# Patient Record
Sex: FEMALE | Race: WHITE | NOT HISPANIC OR LATINO | URBAN - METROPOLITAN AREA
[De-identification: names, ages, dates, MRNs, and addresses within clinical notes are randomized per-mention and may not be internally consistent; named-entity substitution may affect disease eponyms.]

---

## 2020-01-01 ENCOUNTER — OUTPATIENT (OUTPATIENT)
Dept: OUTPATIENT SERVICES | Facility: HOSPITAL | Age: 35
LOS: 1 days | End: 2020-01-01
Payer: MEDICAID

## 2020-01-28 DIAGNOSIS — Z71.89 OTHER SPECIFIED COUNSELING: ICD-10-CM

## 2020-05-01 PROCEDURE — G9005: CPT

## 2022-03-10 ENCOUNTER — NON-APPOINTMENT (OUTPATIENT)
Age: 37
End: 2022-03-10

## 2022-03-10 DIAGNOSIS — N91.2 AMENORRHEA, UNSPECIFIED: ICD-10-CM

## 2022-03-10 DIAGNOSIS — Z33.2 ENCOUNTER FOR ELECTIVE TERMINATION OF PREGNANCY: ICD-10-CM

## 2022-03-10 DIAGNOSIS — Z78.9 OTHER SPECIFIED HEALTH STATUS: ICD-10-CM

## 2022-03-10 RX ORDER — DEXTROAMPHETAMINE SACCHARATE, AMPHETAMINE ASPARTATE, DEXTROAMPHETAMINE SULFATE, AND AMPHETAMINE SULFATE 3.75; 3.75; 3.75; 3.75 MG/1; MG/1; MG/1; MG/1
TABLET ORAL
Refills: 0 | Status: ACTIVE | COMMUNITY

## 2022-03-10 RX ORDER — ARIPIPRAZOLE 2 MG/1
TABLET ORAL
Refills: 0 | Status: ACTIVE | COMMUNITY

## 2022-03-10 RX ORDER — CLONAZEPAM 2 MG
TABLET,DISINTEGRATING ORAL
Refills: 0 | Status: ACTIVE | COMMUNITY

## 2022-03-17 ENCOUNTER — APPOINTMENT (OUTPATIENT)
Dept: OBGYN | Facility: CLINIC | Age: 37
End: 2022-03-17
Payer: MEDICAID

## 2022-03-17 VITALS
HEIGHT: 58 IN | HEART RATE: 88 BPM | BODY MASS INDEX: 34.63 KG/M2 | WEIGHT: 165 LBS | SYSTOLIC BLOOD PRESSURE: 131 MMHG | DIASTOLIC BLOOD PRESSURE: 88 MMHG

## 2022-03-17 DIAGNOSIS — O20.0 THREATENED ABORTION: ICD-10-CM

## 2022-03-17 PROBLEM — Z00.00 ENCOUNTER FOR PREVENTIVE HEALTH EXAMINATION: Noted: 2022-03-17

## 2022-03-17 PROCEDURE — 99212 OFFICE O/P EST SF 10 MIN: CPT

## 2022-03-17 PROCEDURE — 81025 URINE PREGNANCY TEST: CPT

## 2022-03-17 PROCEDURE — 36415 COLL VENOUS BLD VENIPUNCTURE: CPT

## 2022-03-17 NOTE — HISTORY OF PRESENT ILLNESS
[FreeTextEntry1] : pt states she started spotting today 3/17/22.\par \par Patient presents for evaluation and states that she had home positive pregnancy test 2 weeks prior\par Patient states she has been spotting over the past 2436 hrs.\par UCG here in the office today is negative\par Results discussed with the patient and her \par Patient will have a beta-hCG and progesterone drawn and will be called with results\par Patient's blood type is a positive\par No physical exam performed today\par \par Patient will return in the future for complete annual with Pap smear after she stops bleeding

## 2022-03-17 NOTE — PLAN
[FreeTextEntry1] : Beta-hCG and progesterone drawn today\par Patient will be called with the results and follow-up

## 2022-03-18 LAB
HCG SERPL-MCNC: <1 MIU/ML
HCG UR QL: NEGATIVE
PROGEST SERPL-MCNC: 0.3 NG/ML

## 2022-06-02 ENCOUNTER — APPOINTMENT (OUTPATIENT)
Dept: OBGYN | Facility: CLINIC | Age: 37
End: 2022-06-02
Payer: MEDICAID

## 2022-06-02 VITALS
HEART RATE: 92 BPM | BODY MASS INDEX: 32.75 KG/M2 | DIASTOLIC BLOOD PRESSURE: 80 MMHG | WEIGHT: 156 LBS | HEIGHT: 58 IN | SYSTOLIC BLOOD PRESSURE: 120 MMHG

## 2022-06-02 DIAGNOSIS — N76.0 ACUTE VAGINITIS: ICD-10-CM

## 2022-06-02 PROCEDURE — 99213 OFFICE O/P EST LOW 20 MIN: CPT

## 2022-06-02 RX ORDER — METRONIDAZOLE 500 MG/1
500 TABLET ORAL TWICE DAILY
Qty: 20 | Refills: 0 | Status: ACTIVE | COMMUNITY
Start: 2022-06-02 | End: 1900-01-01

## 2022-06-02 RX ORDER — CEPHALEXIN 500 MG/1
500 CAPSULE ORAL 4 TIMES DAILY
Qty: 40 | Refills: 0 | Status: ACTIVE | COMMUNITY
Start: 2022-06-02 | End: 1900-01-01

## 2022-06-02 NOTE — PLAN
[FreeTextEntry1] : Patient is a 37-year-old  5 para 2-0-3-2 last menstrual period May 28, 2022\par Patient presents complaining of vaginal pain and pelvic pain after relations approximately 3 weeks ago, and now the pain is persistent and is with every time she is being penetrated\par Patient denies any vaginal discharge, odor, fevers\par Pelvic exam shows normal female external genitalia, vaginal introitus tender at the 11 o'clock position with the point tenderness, minimal erythema no evidence of skin breakdown or erosion.\par Mild cervical motion tenderness, mild uterine tenderness,.\par Vaginal culture performed\par Urine culture sent\par Patient to be treated with Flagyl 500 mg twice a day for 10 days and also Keflex 500 every 6 hours for 10 days\par Follow-up visit in the office in 2 weeks after completion of the above therapy\par Patient that she understands\par Will await cultures of urine sample and vaginal cultures to possibly consider changing antibiotic treatment

## 2022-06-02 NOTE — PHYSICAL EXAM
[Chaperone Present] : A chaperone was present in the examining room during all aspects of the physical examination [Labia Majora] : normal [Labia Minora] : normal [Normal] : normal [Tenderness] : tender [Anteversion] : anteverted [Mass ___ cm] : no uterine mass was palpated [Uterine Adnexae] : normal [FreeTextEntry4] : Asked meTender in the upper introitus vaginal vault at approximately 11:00\par No evidence of lesions,,, minimal erythema [FreeTextEntry5] : Mild cervical motion tenderness

## 2022-06-02 NOTE — HISTORY OF PRESENT ILLNESS
[FreeTextEntry1] : Patient 37-year-old  5 para 2-0-3-2 last menstrual period May 28, 2022\par Patient presents for evaluation complaining of pain in the pelvic vaginal area for the past 3 weeks after relations that occurred 3 weeks ago but this point pain is with every time she is being penetrated\par Patient denies any vaginal discharge, order, fevers
97.8

## 2022-06-03 LAB
APPEARANCE: CLEAR
BILIRUBIN URINE: NEGATIVE
BLOOD URINE: NEGATIVE
COLOR: YELLOW
GLUCOSE QUALITATIVE U: NEGATIVE
KETONES URINE: NEGATIVE
LEUKOCYTE ESTERASE URINE: NEGATIVE
NITRITE URINE: NEGATIVE
PH URINE: 6.5
PROTEIN URINE: NEGATIVE
SPECIFIC GRAVITY URINE: 1.01
UROBILINOGEN URINE: NORMAL

## 2022-06-05 LAB — BACTERIA UR CULT: NORMAL

## 2022-06-07 ENCOUNTER — NON-APPOINTMENT (OUTPATIENT)
Age: 37
End: 2022-06-07

## 2022-06-08 LAB
A VAGINAE DNA VAG QL NAA+PROBE: ABNORMAL
BVAB2 DNA VAG QL NAA+PROBE: ABNORMAL
C KRUSEI DNA VAG QL NAA+PROBE: NEGATIVE
C TRACH RRNA SPEC QL NAA+PROBE: NEGATIVE
MEGA1 DNA VAG QL NAA+PROBE: ABNORMAL
N GONORRHOEA RRNA SPEC QL NAA+PROBE: NEGATIVE
T VAGINALIS RRNA SPEC QL NAA+PROBE: NEGATIVE

## 2022-06-16 ENCOUNTER — APPOINTMENT (OUTPATIENT)
Dept: OBGYN | Facility: CLINIC | Age: 37
End: 2022-06-16
Payer: MEDICAID

## 2022-06-16 VITALS
HEIGHT: 58 IN | SYSTOLIC BLOOD PRESSURE: 118 MMHG | BODY MASS INDEX: 32.75 KG/M2 | HEART RATE: 94 BPM | WEIGHT: 156 LBS | DIASTOLIC BLOOD PRESSURE: 78 MMHG

## 2022-06-16 PROCEDURE — 99213 OFFICE O/P EST LOW 20 MIN: CPT

## 2022-06-16 RX ORDER — FLUCONAZOLE 150 MG/1
150 TABLET ORAL
Qty: 1 | Refills: 2 | Status: ACTIVE | COMMUNITY
Start: 2022-06-16 | End: 1900-01-01

## 2022-06-16 NOTE — PHYSICAL EXAM
[Chaperone Present] : A chaperone was present in the examining room during all aspects of the physical examination [Labia Majora] : normal [Labia Minora] : normal [Normal] : normal [FreeTextEntry2] : Left labia minor wirh exquisite point tenderness

## 2022-06-16 NOTE — PLAN
[FreeTextEntry1] : Patient is a 37-year-old  5 para 2-0-3-2 last menstrual period May 28, 2022\par Patient presents for follow-up\par Patient has been treated for vaginitis with antibiotics but has persistent point tenderness on the left labia minora\par Minimal white discharge,,, vaginal culture done,,, consistent with vaginal yeast,,, patient will be treated with Diflucan\par Point tenderness of the labia minora on the left side consistent with area of slight disruption of the tissue that has healed.\par Possible neuroma from the healing process,,, may explain the point tenderness\par Patient will be treated with Diflucan as stated for the yeast infection,,, of course pending culture results treatment may be altered\par 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

## 2022-06-16 NOTE — HISTORY OF PRESENT ILLNESS
[FreeTextEntry1] : Patient is a 37-year-old  5 para 2-0-3-2 last menstrual period May 28, 2022\par Patient presents for a follow-up after being treated for vaginitis but has persistent pain in the introitus of the vagina

## 2022-06-20 LAB
A VAGINAE DNA VAG QL NAA+PROBE: NORMAL
BVAB2 DNA VAG QL NAA+PROBE: NORMAL
C KRUSEI DNA VAG QL NAA+PROBE: NEGATIVE
C KRUSEI DNA VAG QL NAA+PROBE: POSITIVE
C TRACH RRNA SPEC QL NAA+PROBE: NEGATIVE
MEGA1 DNA VAG QL NAA+PROBE: NORMAL
N GONORRHOEA RRNA SPEC QL NAA+PROBE: NEGATIVE
T VAGINALIS RRNA SPEC QL NAA+PROBE: NEGATIVE

## 2022-07-19 ENCOUNTER — RESULT CHARGE (OUTPATIENT)
Age: 37
End: 2022-07-19

## 2022-07-19 ENCOUNTER — APPOINTMENT (OUTPATIENT)
Age: 37
End: 2022-07-19

## 2022-07-19 VITALS
WEIGHT: 153 LBS | BODY MASS INDEX: 32.12 KG/M2 | HEIGHT: 58 IN | SYSTOLIC BLOOD PRESSURE: 131 MMHG | DIASTOLIC BLOOD PRESSURE: 85 MMHG

## 2022-07-19 DIAGNOSIS — R10.2 PELVIC AND PERINEAL PAIN: ICD-10-CM

## 2022-07-19 DIAGNOSIS — N94.10 UNSPECIFIED DYSPAREUNIA: ICD-10-CM

## 2022-07-19 DIAGNOSIS — Z78.9 OTHER SPECIFIED HEALTH STATUS: ICD-10-CM

## 2022-07-19 DIAGNOSIS — N94.819 VULVODYNIA, UNSPECIFIED: ICD-10-CM

## 2022-07-19 LAB
BILIRUB UR QL STRIP: NORMAL
CLARITY UR: CLEAR
COLLECTION METHOD: NORMAL
GLUCOSE UR-MCNC: NORMAL
HCG UR QL: 0.2 EU/DL
HGB UR QL STRIP.AUTO: NORMAL
KETONES UR-MCNC: NORMAL
LEUKOCYTE ESTERASE UR QL STRIP: NORMAL
NITRITE UR QL STRIP: NORMAL
PH UR STRIP: 6
PROT UR STRIP-MCNC: NORMAL
SP GR UR STRIP: 1.02

## 2022-07-19 PROCEDURE — 99214 OFFICE O/P EST MOD 30 MIN: CPT

## 2022-07-19 PROCEDURE — 51798 US URINE CAPACITY MEASURE: CPT

## 2022-07-19 PROCEDURE — 81003 URINALYSIS AUTO W/O SCOPE: CPT | Mod: QW

## 2022-07-19 PROCEDURE — 99204 OFFICE O/P NEW MOD 45 MIN: CPT

## 2022-07-19 RX ORDER — OXCARBAZEPINE 150 MG/1
150 TABLET, FILM COATED ORAL
Qty: 60 | Refills: 0 | Status: ACTIVE | COMMUNITY
Start: 2021-10-15

## 2022-07-19 RX ORDER — ARIPIPRAZOLE 10 MG/1
10 TABLET ORAL
Qty: 30 | Refills: 0 | Status: ACTIVE | COMMUNITY
Start: 2021-09-10

## 2022-07-19 RX ORDER — DEXTROAMPHETAMINE SACCHARATE, AMPHETAMINE ASPARTATE, DEXTROAMPHETAMINE SULFATE AND AMPHETAMINE SULFATE 5; 5; 5; 5 MG/1; MG/1; MG/1; MG/1
20 TABLET ORAL
Qty: 90 | Refills: 0 | Status: ACTIVE | COMMUNITY
Start: 2022-07-07

## 2022-07-19 RX ORDER — CLONAZEPAM 1 MG/1
1 TABLET ORAL
Qty: 60 | Refills: 0 | Status: ACTIVE | COMMUNITY
Start: 2022-04-15

## 2022-07-19 NOTE — DISCUSSION/SUMMARY
[FreeTextEntry1] : \gloria Britton presents with difficulty with intercourse. 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 I recommend she f/u with Yadira MALONE, can see me sooner if no improvement in symptoms. All questions were answered

## 2022-07-19 NOTE — HISTORY OF PRESENT ILLNESS
[FreeTextEntry1] : \par Reba presents for a tear in the vagina, she reports that there is a defect in the vagina, she reports that has been for 2 months, she reports that she saw a plastic surgeon, she denies urinary complaints, no intermittent stream , no UTIs, no bulge symptoms, not breast feeding, continues to get menses, does not use contraception, she reports that she does not have vaginal bleeding, she reports that she had negative testing, she reports that she cannot have intercourse bc it is painful, she reports that she feels like a knife is inserted inside of her, she is not in pain any other time of day, not in pain with urination, does not use tamps, all of a sudden she thought she was sore from sex but cannot note\par \par positive candida 7/16

## 2022-07-19 NOTE — OB HISTORY
[ ___] : [unfilled]  section delivery(s) [Regular Cycle Intervals] : periods have been regular [Sexually Active] : sexually active

## 2022-07-19 NOTE — PHYSICAL EXAM
[Chaperone Present] : A chaperone was present in the examining room during all aspects of the physical examination [No Acute Distress] : in no acute distress [Well developed] : well developed [Well Nourished] : ~L well nourished [Oriented x3] : oriented to person, place, and time [Normal Memory] : ~T memory was ~L unimpaired [Normal Mood/Affect] : mood and affect are normal [No Edema] : ~T edema was not present [None] : no CVA tenderness [Warm and Dry] : was warm and dry to touch [Normal Gait] : gait was normal [Labia Majora] : were normal [Labia Minora] : were normal [Normal Appearance] : general appearance was normal [No Bleeding] : there was no active vaginal bleeding [2] : 2 [Normal] : no abnormalities [Cough] : no cough [Tenderness] : ~T no ~M abdominal tenderness observed [Distended] : not distended [Inguinal LAD] : no adenopathy was noted in the inguinal lymph nodes [FreeTextEntry4] : no levator spasm, qtip tenderness 3 to 9 oclock, small hymenal tear at 3 oclock  [de-identified] : no POP [de-identified] : unable to do speculum due to pain

## 2022-07-26 ENCOUNTER — NON-APPOINTMENT (OUTPATIENT)
Age: 37
End: 2022-07-26

## 2022-07-26 ENCOUNTER — APPOINTMENT (OUTPATIENT)
Dept: UROGYNECOLOGY | Facility: CLINIC | Age: 37
End: 2022-07-26

## 2022-08-12 ENCOUNTER — EMERGENCY (EMERGENCY)
Facility: HOSPITAL | Age: 37
LOS: 0 days | Discharge: HOME | End: 2022-08-12
Attending: EMERGENCY MEDICINE
Payer: MEDICAID

## 2022-08-12 VITALS
DIASTOLIC BLOOD PRESSURE: 85 MMHG | TEMPERATURE: 100 F | OXYGEN SATURATION: 95 % | HEART RATE: 115 BPM | RESPIRATION RATE: 18 BRPM | HEIGHT: 59 IN | SYSTOLIC BLOOD PRESSURE: 150 MMHG | WEIGHT: 115.08 LBS

## 2022-08-12 DIAGNOSIS — F41.9 ANXIETY DISORDER, UNSPECIFIED: ICD-10-CM

## 2022-08-12 DIAGNOSIS — Z88.2 ALLERGY STATUS TO SULFONAMIDES: ICD-10-CM

## 2022-08-12 DIAGNOSIS — F20.9 SCHIZOPHRENIA, UNSPECIFIED: ICD-10-CM

## 2022-08-12 PROCEDURE — 99284 EMERGENCY DEPT VISIT MOD MDM: CPT

## 2022-08-12 PROCEDURE — 99283 EMERGENCY DEPT VISIT LOW MDM: CPT

## 2022-08-12 NOTE — ED PROVIDER NOTE - PATIENT PORTAL LINK FT
You can access the FollowMyHealth Patient Portal offered by [facility] by registering at the following website: [email or web address]. By joining [employer]’s FollowMyHealth portal, you will also be able to view your health information using other applications (apps) compatible with our system.

## 2022-08-12 NOTE — ED PROVIDER NOTE - NSFOLLOWUPINSTRUCTIONS_ED_ALL_ED_FT
Depression    WHAT YOU NEED TO KNOW:    Depression is a medical condition that causes feelings of sadness or hopelessness that do not go away. Depression may cause you to lose interest in things you used to enjoy. These feelings may interfere with your daily life.    DISCHARGE INSTRUCTIONS:    Call your local emergency number (911 in the ) if:     You think about harming yourself or someone else.      You have done something on purpose to hurt yourself.    Call your therapist or doctor if:     Your symptoms do not improve.      You cannot make it to your next appointment.       You have new symptoms.      You have questions or concerns about your condition or care.    The following resources are available at any time to help you, if needed:     National Suicide Prevention Lifeline: 1-396.108.7050 (8-417-974-TALK)      Suicide Hotline: 1-683.568.6566 (1-220-LOWEZKB)      For a list of international numbers: https://[employer].org/find-help/international-resources/    Medicines:     Antidepressants may be given to improve or balance your mood. You may need to take this medicine for several weeks before you begin to feel better.      Take your medicine as directed. Contact your healthcare provider if you think your medicine is not helping or if you have side effects. Tell him of her if you are allergic to any medicine. Keep a list of the medicines, vitamins, and herbs you take. Include the amounts, and when and why you take them. Bring the list or the pill bottles to follow-up visits. Carry your medicine list with you in case of an emergency.    Therapy is often used together with medicine to relieve depression. Therapy is a way for you to talk about your feelings and anything that may be causing depression. Therapy can be done alone or in a group. It may also be done with family members or a significant other.    Self-care:     Get regular physical activity. Try to be active for 30 minutes, 3 to 5 days a week. Physical activity can help relieve depression. Work with your healthcare provider to develop a plan that you enjoy. It may help to ask someone to be active with you.      Create a regular sleep schedule. A routine can help you relax before bed. Listen to music, read, or do yoga. Try to go to bed and wake up at the same time every day. Sleep is important for emotional health.      Eat a variety of healthy foods. Healthy foods include fruits, vegetables, whole-grain breads, low-fat dairy products, lean meats, fish, and cooked beans. A healthy meal plan is low in fat, salt, and added sugar.      Do not drink alcohol or use drugs. Alcohol and drugs can make depression worse. Talk to your therapist or doctor if you need help quitting.    Follow up with your healthcare provider as directed: Your healthcare provider will monitor your progress at follow-up visits. He or she will also monitor your medicine if you take antidepressants. Your healthcare provider will ask if the medicine is helping. Tell him or her about any side effects or problems you may have with your medicine. The type or amount of medicine may need to be changed. Write down your questions so you remember to ask them during your visits.    Anxiety    WHAT YOU NEED TO KNOW:    Anxiety is a condition that causes you to feel extremely worried or nervous. The feelings are so strong that they can cause problems with your daily activities or sleep. Anxiety may be triggered by something you fear, or it may happen without a cause. Family or work stress, smoking, caffeine, and alcohol can increase your risk for anxiety. Certain medicines or health conditions can also increase your risk. Anxiety can become a long-term condition if it is not managed or treated.     DISCHARGE INSTRUCTIONS:    Call 911 if:     You have chest pain, tightness, or heaviness that may spread to your shoulders, arms, jaw, neck, or back.      You feel like hurting yourself or someone else.     Contact your healthcare provider if:     Your symptoms get worse or do not get better with treatment.      Your anxiety keeps you from doing your regular daily activities.      You have new symptoms since your last visit.      You have questions or concerns about your condition or care.    Medicines:     Medicines may be given to help you feel more calm and relaxed, and decrease your symptoms.      Take your medicine as directed. Contact your healthcare provider if you think your medicine is not helping or if you have side effects. Tell him of her if you are allergic to any medicine. Keep a list of the medicines, vitamins, and herbs you take. Include the amounts, and when and why you take them. Bring the list or the pill bottles to follow-up visits. Carry your medicine list with you in case of an emergency.    Follow up with your healthcare provider within 2 weeks or as directed: Write down your questions so you remember to ask them during your visits.    Manage anxiety:     Talk to someone about your anxiety. Your healthcare provider may suggest counseling. Cognitive behavioral therapy can help you understand and change how you react to events that trigger your symptoms. You might feel more comfortable talking with a friend or family member about your anxiety. Choose someone you know will be supportive and encouraging.      Find ways to relax. Activities such as exercise, meditation, or listening to music can help you relax. Spend time with friends, or do things you enjoy.      Practice deep breathing. Deep breathing can help you relax when you feel anxious. Focus on taking slow, deep breaths several times a day, or during an anxiety attack. Breathe in through your nose and out through your mouth.       Create a regular sleep routine. Regular sleep can help you feel calmer during the day. Go to sleep and wake up at the same times every day. Do not watch television or use the computer right before bed. Your room should be comfortable, dark, and quiet.       Eat a variety of healthy foods. Healthy foods include fruits, vegetables, low-fat dairy products, lean meats, fish, whole-grain breads, and cooked beans. Healthy foods can help you feel less anxious and have more energy.      Exercise regularly. Exercise can increase your energy level. Exercise may also lift your mood and help you sleep better. Your healthcare provider can help you create an exercise plan.      Do not smoke. Nicotine and other chemicals in cigarettes and cigars can increase anxiety. Ask your healthcare provider for information if you currently smoke and need help to quit. E-cigarettes or smokeless tobacco still contain nicotine. Talk to your healthcare provider before you use these products.       Do not have caffeine. Caffeine can make your symptoms worse. Do not have foods or drinks that are meant to increase your energy level.      Limit or do not drink alcohol. Ask your healthcare provider if alcohol is safe for you. You may not be able to drink alcohol if you take certain anxiety or depression medicines. Limit alcohol to 1 drink per day if you are a woman. Limit alcohol to 2 drinks per day if you are a man. A drink of alcohol is 12 ounces of beer, 5 ounces of wine, or 1½ ounces of liquor.       Do not use drugs. Drugs can make your anxiety worse. It can also make anxiety hard to manage. Talk to your healthcare provider if you use drugs and want help to quit.    Follow-up with your psychiatrist in 1 to 3 days.

## 2022-08-12 NOTE — ED PROVIDER NOTE - PSYCHIATRIC, MLM
Alert and oriented to person, place, time/situation. Pt is anxious. no apparent risk to self or others.

## 2022-08-12 NOTE — ED PROVIDER NOTE - OBJECTIVE STATEMENT
36 y/o with a history of schizophrenia comes in after going to the [facility] asking for help. Pt did not want to disclose why she was here, stating "you don't need to know the whole story". Pt later states that she lives alone and feels like there are people in the house. Pt may be having hallucinations. Pt states she takes Adderall, but did not state if she took any medication for her schizophrenia. Pt refuses to answer any other questions. Refused to answer if she has SI, HI.

## 2022-08-12 NOTE — ED ADULT TRIAGE NOTE - CHIEF COMPLAINT QUOTE
patient walked into police precinct asking for help. patient report she has not been taking medications for bipolar and schizophrenia. denies SI/HI. patient close to nurses station for enhanced supervision.

## 2022-08-12 NOTE — ED PROVIDER NOTE - PROGRESS NOTE DETAILS
Patient denying any SI/HI or safety concerns and does not want to be evaluated medically or by psychiatry at this time.  Patient is alert and oriented and clinically sober.  Patient will be discharged with strict return precautions.    Jason Crawford, DO

## 2023-08-17 ENCOUNTER — EMERGENCY (EMERGENCY)
Facility: HOSPITAL | Age: 38
LOS: 1 days | Discharge: HOME | End: 2023-08-17
Attending: STUDENT IN AN ORGANIZED HEALTH CARE EDUCATION/TRAINING PROGRAM
Payer: MEDICAID

## 2023-08-17 VITALS
SYSTOLIC BLOOD PRESSURE: 104 MMHG | TEMPERATURE: 98 F | HEART RATE: 67 BPM | OXYGEN SATURATION: 100 % | RESPIRATION RATE: 18 BRPM | DIASTOLIC BLOOD PRESSURE: 58 MMHG

## 2023-08-17 VITALS
DIASTOLIC BLOOD PRESSURE: 115 MMHG | RESPIRATION RATE: 20 BRPM | HEART RATE: 93 BPM | WEIGHT: 160.06 LBS | SYSTOLIC BLOOD PRESSURE: 149 MMHG | OXYGEN SATURATION: 100 % | HEIGHT: 63 IN | TEMPERATURE: 98 F

## 2023-08-17 DIAGNOSIS — F43.20 ADJUSTMENT DISORDER, UNSPECIFIED: ICD-10-CM

## 2023-08-17 PROBLEM — Z86.59 PERSONAL HISTORY OF OTHER MENTAL AND BEHAVIORAL DISORDERS: Chronic | Status: ACTIVE | Noted: 2022-08-12

## 2023-08-17 LAB
AMPHET UR-MCNC: NEGATIVE — SIGNIFICANT CHANGE UP
ANION GAP SERPL CALC-SCNC: 11 MMOL/L — SIGNIFICANT CHANGE UP (ref 5–17)
APAP SERPL-MCNC: <3 UG/ML — LOW (ref 10–26)
APPEARANCE UR: CLEAR — SIGNIFICANT CHANGE UP
BACTERIA # UR AUTO: ABNORMAL
BARBITURATES UR SCN-MCNC: NEGATIVE — SIGNIFICANT CHANGE UP
BASOPHILS # BLD AUTO: 0.06 K/UL — SIGNIFICANT CHANGE UP (ref 0–0.2)
BASOPHILS NFR BLD AUTO: 0.5 % — SIGNIFICANT CHANGE UP (ref 0–2)
BENZODIAZ UR-MCNC: NEGATIVE — SIGNIFICANT CHANGE UP
BILIRUB UR-MCNC: NEGATIVE — SIGNIFICANT CHANGE UP
BUN SERPL-MCNC: 18.6 MG/DL — SIGNIFICANT CHANGE UP (ref 8–20)
CALCIUM SERPL-MCNC: 9.2 MG/DL — SIGNIFICANT CHANGE UP (ref 8.4–10.5)
CHLORIDE SERPL-SCNC: 105 MMOL/L — SIGNIFICANT CHANGE UP (ref 96–108)
CO2 SERPL-SCNC: 26 MMOL/L — SIGNIFICANT CHANGE UP (ref 22–29)
COCAINE METAB.OTHER UR-MCNC: NEGATIVE — SIGNIFICANT CHANGE UP
COLOR SPEC: YELLOW — SIGNIFICANT CHANGE UP
CREAT SERPL-MCNC: 0.7 MG/DL — SIGNIFICANT CHANGE UP (ref 0.5–1.3)
DIFF PNL FLD: ABNORMAL
EGFR: 113 ML/MIN/1.73M2 — SIGNIFICANT CHANGE UP
EOSINOPHIL # BLD AUTO: 0.03 K/UL — SIGNIFICANT CHANGE UP (ref 0–0.5)
EOSINOPHIL NFR BLD AUTO: 0.3 % — SIGNIFICANT CHANGE UP (ref 0–6)
EPI CELLS # UR: SIGNIFICANT CHANGE UP
ETHANOL SERPL-MCNC: <10 MG/DL — SIGNIFICANT CHANGE UP (ref 0–9)
GLUCOSE SERPL-MCNC: 102 MG/DL — HIGH (ref 70–99)
GLUCOSE UR QL: NEGATIVE MG/DL — SIGNIFICANT CHANGE UP
HCT VFR BLD CALC: 38.4 % — SIGNIFICANT CHANGE UP (ref 34.5–45)
HGB BLD-MCNC: 12.8 G/DL — SIGNIFICANT CHANGE UP (ref 11.5–15.5)
IMM GRANULOCYTES NFR BLD AUTO: 0.4 % — SIGNIFICANT CHANGE UP (ref 0–0.9)
KETONES UR-MCNC: NEGATIVE — SIGNIFICANT CHANGE UP
LEUKOCYTE ESTERASE UR-ACNC: ABNORMAL
LYMPHOCYTES # BLD AUTO: 1.23 K/UL — SIGNIFICANT CHANGE UP (ref 1–3.3)
LYMPHOCYTES # BLD AUTO: 10.8 % — LOW (ref 13–44)
MCHC RBC-ENTMCNC: 30.6 PG — SIGNIFICANT CHANGE UP (ref 27–34)
MCHC RBC-ENTMCNC: 33.3 GM/DL — SIGNIFICANT CHANGE UP (ref 32–36)
MCV RBC AUTO: 91.9 FL — SIGNIFICANT CHANGE UP (ref 80–100)
METHADONE UR-MCNC: NEGATIVE — SIGNIFICANT CHANGE UP
MONOCYTES # BLD AUTO: 0.51 K/UL — SIGNIFICANT CHANGE UP (ref 0–0.9)
MONOCYTES NFR BLD AUTO: 4.5 % — SIGNIFICANT CHANGE UP (ref 2–14)
NEUTROPHILS # BLD AUTO: 9.55 K/UL — HIGH (ref 1.8–7.4)
NEUTROPHILS NFR BLD AUTO: 83.5 % — HIGH (ref 43–77)
NITRITE UR-MCNC: NEGATIVE — SIGNIFICANT CHANGE UP
OPIATES UR-MCNC: NEGATIVE — SIGNIFICANT CHANGE UP
PCP SPEC-MCNC: SIGNIFICANT CHANGE UP
PCP UR-MCNC: NEGATIVE — SIGNIFICANT CHANGE UP
PH UR: 6 — SIGNIFICANT CHANGE UP (ref 5–8)
PLATELET # BLD AUTO: 285 K/UL — SIGNIFICANT CHANGE UP (ref 150–400)
POTASSIUM SERPL-MCNC: 3.9 MMOL/L — SIGNIFICANT CHANGE UP (ref 3.5–5.3)
POTASSIUM SERPL-SCNC: 3.9 MMOL/L — SIGNIFICANT CHANGE UP (ref 3.5–5.3)
PROT UR-MCNC: 15
RBC # BLD: 4.18 M/UL — SIGNIFICANT CHANGE UP (ref 3.8–5.2)
RBC # FLD: 13.2 % — SIGNIFICANT CHANGE UP (ref 10.3–14.5)
RBC CASTS # UR COMP ASSIST: SIGNIFICANT CHANGE UP /HPF (ref 0–4)
SALICYLATES SERPL-MCNC: <0.6 MG/DL — LOW (ref 10–20)
SODIUM SERPL-SCNC: 142 MMOL/L — SIGNIFICANT CHANGE UP (ref 135–145)
SP GR SPEC: 1.01 — SIGNIFICANT CHANGE UP (ref 1.01–1.02)
THC UR QL: NEGATIVE — SIGNIFICANT CHANGE UP
UROBILINOGEN FLD QL: NEGATIVE MG/DL — SIGNIFICANT CHANGE UP
WBC # BLD: 11.43 K/UL — HIGH (ref 3.8–10.5)
WBC # FLD AUTO: 11.43 K/UL — HIGH (ref 3.8–10.5)
WBC UR QL: SIGNIFICANT CHANGE UP /HPF (ref 0–5)

## 2023-08-17 PROCEDURE — 84436 ASSAY OF TOTAL THYROXINE: CPT

## 2023-08-17 PROCEDURE — 90792 PSYCH DIAG EVAL W/MED SRVCS: CPT

## 2023-08-17 PROCEDURE — 85025 COMPLETE CBC W/AUTO DIFF WBC: CPT

## 2023-08-17 PROCEDURE — 84480 ASSAY TRIIODOTHYRONINE (T3): CPT

## 2023-08-17 PROCEDURE — 87086 URINE CULTURE/COLONY COUNT: CPT

## 2023-08-17 PROCEDURE — 84484 ASSAY OF TROPONIN QUANT: CPT

## 2023-08-17 PROCEDURE — 99285 EMERGENCY DEPT VISIT HI MDM: CPT | Mod: 25

## 2023-08-17 PROCEDURE — 80048 BASIC METABOLIC PNL TOTAL CA: CPT

## 2023-08-17 PROCEDURE — 93005 ELECTROCARDIOGRAM TRACING: CPT

## 2023-08-17 PROCEDURE — 36415 COLL VENOUS BLD VENIPUNCTURE: CPT

## 2023-08-17 PROCEDURE — 93010 ELECTROCARDIOGRAM REPORT: CPT

## 2023-08-17 PROCEDURE — 81001 URINALYSIS AUTO W/SCOPE: CPT

## 2023-08-17 PROCEDURE — 80307 DRUG TEST PRSMV CHEM ANLYZR: CPT

## 2023-08-17 PROCEDURE — 99285 EMERGENCY DEPT VISIT HI MDM: CPT

## 2023-08-17 PROCEDURE — 84443 ASSAY THYROID STIM HORMONE: CPT

## 2023-08-17 NOTE — ED ADULT TRIAGE NOTE - CHIEF COMPLAINT QUOTE
patient in [employer] custody at court developed "light chest tightness" which has since subsided. patient states she is hearing voices that are telling her to harm herself. patient changed into yellow gown, belongings out of reach. patient in [employer] custody at court developed "light chest tightness" which has since subsided. patient states she is hearing voices that are telling her to harm herself. patient changed into yellow gown, belongings out of reach. states she off her meds

## 2023-08-17 NOTE — ED BEHAVIORAL HEALTH ASSESSMENT NOTE - DETAILS
unavailable; discussed with officers at bedside as per hpi Advised patient to go to nearest ER or call 911, for any of the followin) agitation aggression or anxiety, 2) suicidal or homicidal thoughts 3) worsening of symptoms or 4) side effects of medications

## 2023-08-17 NOTE — ED BEHAVIORAL HEALTH ASSESSMENT NOTE - NSSUICRSKFACTOR_PSY_ALL_CORE
Please go directly to the [facility] in [place] for further evaluation. Current and Past Psychiatric Diagnoses/Activating Events/Stressors

## 2023-08-17 NOTE — ED BEHAVIORAL HEALTH ASSESSMENT NOTE - RISK ASSESSMENT
Unable to fully determine level for risk of violence or suicide due to limited patient history/interview.  However, risk will be mitigated by police returning her to a holding facility and being placed on constant observation.

## 2023-08-17 NOTE — ED BEHAVIORAL HEALTH NOTE - BEHAVIORAL HEALTH NOTE
Brief Psychiatry Note    Attempted to interview earlier this afternoon.  Unable to interview due to patient condition/factors, declines interview and prefers to sleep.  Irritable. Does not appear to be distressed dysphoric or overtly manic or psychotic.  Not internally preoccupied. Not engaging in self dialogue.  Appears comfortable and calm otherwise.  Will re attempt later.

## 2023-08-17 NOTE — ED BEHAVIORAL HEALTH ASSESSMENT NOTE - DESCRIPTION
unknown Vital Signs Last 24 Hrs  T(C): 36.9 (17 Aug 2023 15:14), Max: 36.9 (17 Aug 2023 15:14)  T(F): 98.4 (17 Aug 2023 15:14), Max: 98.4 (17 Aug 2023 15:14)  HR: 67 (17 Aug 2023 15:14) (67 - 93)  BP: 104/58 (17 Aug 2023 15:14) (104/58 - 149/115)  BP(mean): --  RR: 18 (17 Aug 2023 15:14) (18 - 20)  SpO2: 100% (17 Aug 2023 15:14) (100% - 100%)    Parameters below as of 17 Aug 2023 10:56  Patient On (Oxygen Delivery Method): room air

## 2023-08-17 NOTE — ED ADULT NURSE NOTE - CAS DISCH TRANSFER METHOD
Alert and oriented to person, place, time/situation. normal mood and affect. no apparent risk to self or others. PD/Other

## 2023-08-17 NOTE — ED PROVIDER NOTE - NSFOLLOWUPCLINICS_GEN_ALL_ED_FT
[id number] [place] [address]  Phone: [phone]  Fax:   Follow Up Time: 1-3 Days

## 2023-08-17 NOTE — ED BEHAVIORAL HEALTH ASSESSMENT NOTE - HPI (INCLUDE ILLNESS QUALITY, SEVERITY, DURATION, TIMING, CONTEXT, MODIFYING FACTORS, ASSOCIATED SIGNS AND SYMPTOMS)
Patient is a 38 year old woman with unclear psychiatric history though possibly schizophrenia per chart though limited information who was brought in by police from court due to somatic concerns of chest tightness.    Per chart review, "MARY BRAY is a 37yo Female with PMH H/O schizophrenia who presents c/o chest tightness. Pt states she currently has tightness in her chest. She is unable to say when it started, instead stating "my brain is confused". She is unable to answer questions regarding symptoms associated w/ her chest pain such as sob but instead states "my brain is confused". PT admits to having audible command hallucinations w/ voices telling her to hurt herself. She is not able to elaborate on the voices. PT unable to provide further history, pmh, medications, simply states "my brain is confused". Pt in police custody, unwilling to state why."    Police officers at bedside [address] and [address] report that they are unaware of the events leading up to presentation as they were sent to relieve previous officers, unaware of her charges. Do however also state that she was in court to be arraigned. Add that should she be released she will return to a holding facility where she will be on constant observation for safety.    Attempted to interview patient multiple times throughout afternoon. On each evaluation did not appear distressed dysphoric or overtly manic or psychotic. Did however repeatedly state for writer to leave her alone and allow her to rest. On later interview stated that "I'm confused", following up with that she's going to rest further. Denies any acute psychiatric issues concerns or complaints, denies any si/hi/avh. No recent alcohol tobacco or drug use. Unwilling to answer any other questions from this writer. Has been behaviorally under control while in ED. Discussed again with same police officers who add that she was speaking with them in coherent non bizarre fashion moments ago.

## 2023-08-17 NOTE — ED ADULT NURSE NOTE - OBJECTIVE STATEMENT
pt in police custody and was at court and started to c/o chest pain and PD had to bring pt to hospital, pt awake, alert and following commands, pt not answering any of RNs questions, resp even and unlabored no distress noted, pt in yellow gown with no belongings and 1:1 at bedside

## 2023-08-17 NOTE — ED PROVIDER NOTE - PATIENT PORTAL LINK FT
You can access the FollowMyHealth Patient Portal offered by [facility] by registering at the following website: [email or web address]. By joining [email or web address]’s FollowMyHealth portal, you will also be able to view your health information using other applications (apps) compatible with our system.

## 2023-08-17 NOTE — ED BEHAVIORAL HEALTH ASSESSMENT NOTE - OTHER
unknown police officers court also discussed with officers did not assess, handcuffed to bed railing

## 2023-08-17 NOTE — ED PROVIDER NOTE - PHYSICAL EXAMINATION
Gen: Well appearing in NAD  Head: NC/AT  Neck: trachea midline  Resp:  No distress  Abd: soft, nd, nt  Ext: no deformities  Neuro:  A&Ox3. Moving all four extremities spontaneously, walking w/ coordinated gait.   Skin:  Warm and dry as visualized  Psych:  Normal affect and mood

## 2023-08-17 NOTE — ED ADULT NURSE NOTE - CHIEF COMPLAINT QUOTE
patient in [employer] custody at court developed "light chest tightness" which has since subsided. patient states she is hearing voices that are telling her to harm herself. patient changed into yellow gown, belongings out of reach. states she off her meds

## 2023-08-17 NOTE — ED PROVIDER NOTE - ATTENDING CONTRIBUTION TO CARE
RUY Staton: 38F stating her brain isn't working repeatedly, here with police officers, was being taken in when symptoms began. Will check psychiatric clearance order set, d/w  to see if this is c/w psychosis though patient at this time per my exam does not appear to be responding to internal stimuli, does not appear internally preoccupied, there is no pressured speech, she appears calm and continues to cover herself with a bed sheet; does not appear like she is manic or psychotic during my examination.

## 2023-08-17 NOTE — ED BEHAVIORAL HEALTH ASSESSMENT NOTE - SUMMARY
Patient is a 38 year old woman with unclear psychiatric history though possibly schizophrenia per chart though limited information who was brought in by police from court due to somatic concerns of chest tightness.  Unclear psychiatric history, however possibly schizophrenia per chart review. Brought in under police custody, awaiting arraignment. While in the ED, behaviorally under control though uncooperative. On multiple encounters does not appear distressed dysphoric or overtly manic or psychotic. With this writer, denies any acute psychiatric issues concerns or complaints. No si/hi/avh. Limited history/interview due to patient condition/factors, uncooperative. Simply states that she prefers to be left alone to sleep and once with this writer feeling confused though unwilling to elaborate.  Discussed again with same police officers who add that she was speaking with them in coherent non bizarre fashion moments ago before this writer. Suggestive that her presentation is primarily volitional and not psychiatric in nature.

## 2023-08-17 NOTE — ED PROVIDER NOTE - CLINICAL SUMMARY MEDICAL DECISION MAKING FREE TEXT BOX
ASSESSMENT:   MARY BRAY is a 37yo F who presented with chest tightness x 1 day. Unwilling or unable to provide further HPI, PMH. Endorsing command hallucinations. In police custody. Vitals stable. Physical exam non contributory.     PLAN: Medical clearance, psych evaluation. Eval for ACS.

## 2023-08-17 NOTE — ED PROVIDER NOTE - NS ED ROS FT
Review of Systems  SKIN: not answering ROS questions  RESPIRATORY: not answering ROS questions  CARDIAC: +CHEST TIGHTNESS  GI: not answering ROS questions  MUSCULOSKELETAL:  not answering ROS questions  NEURO: not answering ROS questions

## 2023-08-17 NOTE — ED PROVIDER NOTE - NSFOLLOWUPINSTRUCTIONS_ED_ALL_ED_FT
Follow up with the resources provided to you by our behavioral health specialists.    Follow up with your primary doctor as needed for repeat evaluation.    Return here or go to the nearest emergency department for any new symptoms of acute concern such as severe pain, intractable nausea/vomiting, or other new worries.

## 2023-08-17 NOTE — ED PROVIDER NOTE - OBJECTIVE STATEMENT
MARY BRAY is a 39yo Female with PMH H/O schizophrenia who presents c/o chest tightness. Pt states she currently has tightness in her chest. She is unable to say when it started, instead stating "my brain is confused". She is unable to answer questions regarding symptoms associated w/ her chest pain such as sob but instead states "my brain is confused". PT admits to having audible command hallucinations w/ voices telling her to hurt herself. She is not able to elaborate on the voices. PT unable to provide further history, pmh, medications, simply states "my brain is confused". Pt in police custody, unwilling to state why.

## 2023-08-18 LAB
CULTURE RESULTS: SIGNIFICANT CHANGE UP
SPECIMEN SOURCE: SIGNIFICANT CHANGE UP

## 2023-12-19 ENCOUNTER — OUTPATIENT (OUTPATIENT)
Dept: OUTPATIENT SERVICES | Facility: HOSPITAL | Age: 38
LOS: 1 days | End: 2023-12-19
Payer: COMMERCIAL

## 2023-12-19 DIAGNOSIS — R01.1 CARDIAC MURMUR, UNSPECIFIED: ICD-10-CM

## 2023-12-19 PROCEDURE — 93306 TTE W/DOPPLER COMPLETE: CPT | Mod: 26

## 2023-12-19 PROCEDURE — 93306 TTE W/DOPPLER COMPLETE: CPT

## 2024-02-07 ENCOUNTER — OUTPATIENT (OUTPATIENT)
Dept: OUTPATIENT SERVICES | Facility: HOSPITAL | Age: 39
LOS: 1 days | End: 2024-02-07
Payer: COMMERCIAL

## 2024-02-07 DIAGNOSIS — F20.9 SCHIZOPHRENIA, UNSPECIFIED: ICD-10-CM

## 2024-02-07 PROCEDURE — 0225U NFCT DS DNA&RNA 21 SARSCOV2: CPT

## 2024-04-20 ENCOUNTER — OFFICE (OUTPATIENT)
Dept: URBAN - METROPOLITAN AREA CLINIC 12 | Facility: CLINIC | Age: 39
Setting detail: OPHTHALMOLOGY
End: 2024-04-20
Payer: MEDICAID

## 2024-04-20 DIAGNOSIS — H35.54: ICD-10-CM

## 2024-04-20 DIAGNOSIS — H52.4: ICD-10-CM

## 2024-04-20 PROBLEM — H52.7 REFRACTIVE ERROR: Status: ACTIVE | Noted: 2024-04-20

## 2024-04-20 PROCEDURE — 92004 COMPRE OPH EXAM NEW PT 1/>: CPT | Performed by: OPTOMETRIST

## 2024-04-20 PROCEDURE — 92134 CPTRZ OPH DX IMG PST SGM RTA: CPT | Performed by: OPTOMETRIST

## 2024-04-20 PROCEDURE — 92015 DETERMINE REFRACTIVE STATE: CPT | Performed by: OPTOMETRIST
